# Patient Record
Sex: MALE | Race: WHITE | NOT HISPANIC OR LATINO | URBAN - METROPOLITAN AREA
[De-identification: names, ages, dates, MRNs, and addresses within clinical notes are randomized per-mention and may not be internally consistent; named-entity substitution may affect disease eponyms.]

---

## 2023-10-16 DIAGNOSIS — E78.5 HYPERLIPIDEMIA, UNSPECIFIED HYPERLIPIDEMIA TYPE: Primary | ICD-10-CM

## 2023-10-16 PROBLEM — I10 HYPERTENSION: Status: ACTIVE | Noted: 2023-10-16

## 2023-10-16 PROBLEM — I71.21 ASCENDING AORTIC ANEURYSM (CMS-HCC): Status: ACTIVE | Noted: 2023-10-16

## 2023-10-16 PROBLEM — R93.1 ELEVATED CORONARY ARTERY CALCIUM SCORE: Status: ACTIVE | Noted: 2023-10-16

## 2023-10-16 PROBLEM — I25.10 CAD (CORONARY ARTERY DISEASE): Status: ACTIVE | Noted: 2023-10-16

## 2023-10-16 PROBLEM — G20.A1 PARKINSON'S DISEASE (MULTI): Status: ACTIVE | Noted: 2023-10-16

## 2023-10-16 RX ORDER — METOPROLOL SUCCINATE 50 MG/1
1 TABLET, EXTENDED RELEASE ORAL DAILY
COMMUNITY
Start: 2020-11-12

## 2023-10-16 RX ORDER — AMLODIPINE BESYLATE 5 MG/1
1 TABLET ORAL DAILY
COMMUNITY
Start: 2020-11-12

## 2023-10-16 RX ORDER — DEXTROMETHORPHAN HYDROBROMIDE, GUAIFENESIN 5; 100 MG/5ML; MG/5ML
LIQUID ORAL 4 TIMES DAILY
COMMUNITY
Start: 2021-11-10

## 2023-10-16 RX ORDER — CARBIDOPA AND LEVODOPA 25; 100 MG/1; MG/1
TABLET ORAL
COMMUNITY
Start: 2020-11-12

## 2023-10-16 RX ORDER — ATORVASTATIN CALCIUM 80 MG/1
TABLET, FILM COATED ORAL
Qty: 90 TABLET | Refills: 3 | Status: SHIPPED | OUTPATIENT
Start: 2023-10-16

## 2023-10-16 RX ORDER — ASPIRIN 81 MG/1
1 TABLET ORAL DAILY
COMMUNITY
Start: 2020-11-12

## 2023-10-16 RX ORDER — ENTACAPONE 200 MG/1
TABLET ORAL
COMMUNITY
Start: 2023-09-15

## 2023-10-16 RX ORDER — ATORVASTATIN CALCIUM 80 MG/1
TABLET, FILM COATED ORAL
COMMUNITY
Start: 2023-07-18 | End: 2023-11-14 | Stop reason: SDUPTHER

## 2023-10-16 RX ORDER — VIT A/VIT C/VIT E/ZINC/COPPER 2148-113
1 TABLET ORAL EVERY 12 HOURS
COMMUNITY
Start: 2020-11-12

## 2023-11-08 ENCOUNTER — HOSPITAL ENCOUNTER (OUTPATIENT)
Dept: RADIOLOGY | Facility: HOSPITAL | Age: 83
Discharge: HOME | End: 2023-11-08
Payer: MEDICARE

## 2023-11-08 DIAGNOSIS — I71.21 ANEURYSM OF THE ASCENDING AORTA, WITHOUT RUPTURE (CMS-HCC): ICD-10-CM

## 2023-11-08 PROCEDURE — 71250 CT THORAX DX C-: CPT | Performed by: RADIOLOGY

## 2023-11-08 PROCEDURE — 71250 CT THORAX DX C-: CPT

## 2023-11-12 PROBLEM — E04.1 NONTOXIC UNINODULAR GOITER: Status: ACTIVE | Noted: 2021-11-01

## 2023-11-14 ENCOUNTER — OFFICE VISIT (OUTPATIENT)
Dept: CARDIOLOGY | Facility: HOSPITAL | Age: 83
End: 2023-11-14
Payer: MEDICARE

## 2023-11-14 VITALS
HEIGHT: 69 IN | BODY MASS INDEX: 26.07 KG/M2 | HEART RATE: 63 BPM | DIASTOLIC BLOOD PRESSURE: 62 MMHG | OXYGEN SATURATION: 96 % | WEIGHT: 176 LBS | SYSTOLIC BLOOD PRESSURE: 112 MMHG

## 2023-11-14 DIAGNOSIS — I71.20 THORACIC AORTIC ANEURYSM WITHOUT RUPTURE, UNSPECIFIED PART (CMS-HCC): ICD-10-CM

## 2023-11-14 DIAGNOSIS — I25.10 CORONARY ARTERY DISEASE, UNSPECIFIED VESSEL OR LESION TYPE, UNSPECIFIED WHETHER ANGINA PRESENT, UNSPECIFIED WHETHER NATIVE OR TRANSPLANTED HEART: Primary | ICD-10-CM

## 2023-11-14 DIAGNOSIS — E78.5 HYPERLIPIDEMIA, UNSPECIFIED HYPERLIPIDEMIA TYPE: ICD-10-CM

## 2023-11-14 DIAGNOSIS — K86.2 CYST OF PANCREAS (HHS-HCC): ICD-10-CM

## 2023-11-14 DIAGNOSIS — I71.21 ANEURYSM OF ASCENDING AORTA WITHOUT RUPTURE (CMS-HCC): ICD-10-CM

## 2023-11-14 PROCEDURE — 3074F SYST BP LT 130 MM HG: CPT | Performed by: INTERNAL MEDICINE

## 2023-11-14 PROCEDURE — 1036F TOBACCO NON-USER: CPT | Performed by: INTERNAL MEDICINE

## 2023-11-14 PROCEDURE — 1160F RVW MEDS BY RX/DR IN RCRD: CPT | Performed by: INTERNAL MEDICINE

## 2023-11-14 PROCEDURE — 99214 OFFICE O/P EST MOD 30 MIN: CPT | Mod: 25 | Performed by: INTERNAL MEDICINE

## 2023-11-14 PROCEDURE — 1159F MED LIST DOCD IN RCRD: CPT | Performed by: INTERNAL MEDICINE

## 2023-11-14 PROCEDURE — 93010 ELECTROCARDIOGRAM REPORT: CPT | Performed by: INTERNAL MEDICINE

## 2023-11-14 PROCEDURE — 99214 OFFICE O/P EST MOD 30 MIN: CPT | Performed by: INTERNAL MEDICINE

## 2023-11-14 PROCEDURE — 93005 ELECTROCARDIOGRAM TRACING: CPT | Performed by: INTERNAL MEDICINE

## 2023-11-14 PROCEDURE — 3078F DIAST BP <80 MM HG: CPT | Performed by: INTERNAL MEDICINE

## 2023-11-14 ASSESSMENT — ENCOUNTER SYMPTOMS
OCCASIONAL FEELINGS OF UNSTEADINESS: 0
LOSS OF SENSATION IN FEET: 0
DEPRESSION: 0

## 2023-11-14 ASSESSMENT — PATIENT HEALTH QUESTIONNAIRE - PHQ9
1. LITTLE INTEREST OR PLEASURE IN DOING THINGS: NOT AT ALL
2. FEELING DOWN, DEPRESSED OR HOPELESS: NOT AT ALL
SUM OF ALL RESPONSES TO PHQ9 QUESTIONS 1 AND 2: 0

## 2023-11-14 NOTE — PROGRESS NOTES
Primary Care Physician: Lalito Garber DO  Date of Visit: 11/14/2023 10:40 AM EST  Location of visit: [facility]     Chief Complaint:   Chief Complaint   Patient presents with    ascending aortic aneurysm    Hyperlipidemia    Hypertension    Coronary Artery Disease        HPI / Summary:   Erich Kaba is a 83 y.o. male presents for followup.  He has no cardiac complaints.  He is physically active and uses a recumbent bicycle for up to 25 minutes daily without chest pain or shortness of breath.  The patient denies chest pain, shortness of breath, palpitations, lightheadedness, syncope, orthopnea, paroxysmal nocturnal dyspnea, lower extremity edema, or bleeding problems.          Past Medical History:  Past Medical History:   Diagnosis Date    Personal history of (healed) traumatic fracture     History of fracture of lower extremity    Personal history of other malignant neoplasm of skin     History of malignant neoplasm of skin        Past Surgical History:  Past Surgical History:   Procedure Laterality Date    OTHER SURGICAL HISTORY  11/12/2020    Knee surgery    OTHER SURGICAL HISTORY  11/12/2020    Skin lesion excision    OTHER SURGICAL HISTORY  11/12/2020    Leg surgery    OTHER SURGICAL HISTORY  11/12/2020    Hernia repair          Social History:   reports that he has quit smoking. His smoking use included pipe. He has never used smokeless tobacco. He reports current alcohol use of about 3.0 standard drinks of alcohol per week. Drug use questions deferred to the physician.     Family History:  family history is not on file.      Allergies:  No Known Allergies    Outpatient Medications:  Current Outpatient Medications   Medication Instructions    acetaminophen (Tylenol 8 HOUR) 650 mg ER tablet oral, 4 times daily    amLODIPine (Norvasc) 5 mg tablet 1 tablet, oral, Daily    aspirin 81 mg EC tablet 1 tablet, oral, Daily    atorvastatin (Lipitor) 80 mg tablet TAKE 1 TABLET DAILY (DOSE INCREASED)     "carbidopa-levodopa (Sinemet)  mg tablet oral    entacapone (Comtan) 200 mg tablet     metoprolol succinate XL (Toprol-XL) 50 mg 24 hr tablet 1 tablet, oral, Daily    vitamins A,C,E-zinc-copper (PreserVision AREDS) 2,148 mcg-113 mg-45 mg-17.4mg tablet 1 tablet, oral, Every 12 hours       Physical Exam:  Vitals:    11/14/23 1138   BP: 112/62   BP Location: Right arm   Patient Position: Sitting   Pulse: 63   SpO2: 96%   Weight: 79.8 kg (176 lb)   Height: 1.753 m (5' 9\")     Wt Readings from Last 5 Encounters:   11/14/23 79.8 kg (176 lb)   11/08/22 79.9 kg (176 lb 0.6 oz)   11/10/21 81.2 kg (179 lb 0.3 oz)   11/12/20 83 kg (183 lb)     Body mass index is 25.99 kg/m².   General: Well-developed well-nourished in no acute distress  HEENT: Normocephalic atraumatic  Neck: Supple, JVP is normal negative hepatojugular reflux 2+ carotid pulses without bruit  Pulmonary: Normal respiratory effort, clear to auscultation  Cardiovascular: No right ventricular heave, normal S1 and S2, no murmurs rubs or gallops  Abdomen: Soft nontender nondistended  Extremities: Warm without edema 2+ radial pulses bilaterally 2+ posterior tibial pulses bilaterally  Neurologic: Alert and oriented x3  Psychiatric: Normal mood and affect     Last Labs:  CMP:  Recent Labs     10/11/21  1004      K 4.6      CO2 30   ANIONGAP 12   BUN 31*   CREATININE 1.26   GLUCOSE 111*   No results for input(s): \"ALBUMIN\", \"ALKPHOS\", \"ALT\", \"AST\", \"BILITOT\", \"LIPASE\" in the last 24908 hours.    No lab exists for component: \"CA\"  CBC:  Recent Labs     10/11/21  1004   WBC 6.6   HGB 15.9   HCT 46.2      MCV 91     COAG: No results for input(s): \"INR\", \"DDIMERVTE\" in the last 02186 hours.  HEME/ENDO:No results for input(s): \"FERRITIN\", \"IRONSAT\", \"TSH\", \"HGBA1C\" in the last 95362 hours.   CARDIAC: No results for input(s): \"LDH\", \"CKMB\", \"TROPHS\", \"BNP\" in the last 07983 hours.    No lab exists for component: \"CK\", \"CKMBP\"  Recent Labs     " 02/10/23  0935 10/11/21  1004 01/14/21  0926   CHOL 100 120 109   LDLF 50 66 60   HDL 38.5* 42.5 35.2*   TRIG 60 60 70     I reviewed laboratory data from November 6, 2023.  Total cholesterol 111 triglycerides 63 HDL 41 LDL 57 glucose 110 creatinine 0.93 potassium 4.7      Last Cardiology Tests:  ECG:  An electrocardiogram performed today that I reviewed shows normal sinus rhythm right bundle branch block left anterior fascicular block.    Echo:  November 8, 2022  CONCLUSIONS:   1. Left ventricular systolic function is normal with a 60-65% estimated ejection fraction.   2. Spectral Doppler shows an impaired relaxation pattern of left ventricular diastolic filling.   3. The left atrium is mild to moderately dilated.   4. Left ventricular cavity size is decreased.   5. There is moderate dilatation of the aortic root.       Cath:      Stress Test:  Stress Results:  No results found for this or any previous visit from the past 365 days.         Cardiac Imaging:  CT chest wo IV contrast  Narrative: Interpreted By:  Nuvia Judd,   STUDY:  CT CHEST WO IV CONTRAST;  11/8/2023 10:15 am      INDICATION:  Signs/Symptoms: surveillance of ascending aortic aneursym  I71.21:  Ascending aortic aneurysm.      COMPARISON:  11/03/2021      ACCESSION NUMBER(S):  [id number]      ORDERING CLINICIAN:  RUTH RENDON      TECHNIQUE:  Helical data acquisition of the chest was obtained  without IV  contrast material.  Images were reformatted in axial, coronal, and  sagittal planes.      FINDINGS:  Lungs and Pleura: Scattered streaky atelectasis. No pulmonary  consolidation. No pleural effusion. No pneumothorax.      Mediastinum and axilla: No adenopathy by CT size criteria. No  cardiomegaly or pericardial effusion. Mild ascending thoracic aorta  aneurysm measuring 4 cm. Mild descending thoracic aortic aneurysm  measuring 3.2 cm. These are stable when compared to prior exam. Heavy  coronary artery calcifications. Mitral and aortic  calcifications.      Visualized Upper Abdomen: Cholelithiasis. Pancreatic cystic lesion  measuring 3 cm. If not already performed, recommend follow-up with  MRI pancreas protocol. Multiple left renal cysts. Partially  visualized abdominal aortic aneurysm measuring 3.1 cm in diameter.      MSK/Chest Wall: No aggressive bony lesion identified. Multilevel  degenerative changes in the spine.      Lower neck: Stable 3.8 cm left thyroid nodule.          Impression: Stable aortic aneurysm.      Partially visualized abdominal aortic aneurysm.      Large cystic lesion in the pancreas. Recommend follow-up with  pancreas MRI.      Cholelithiasis.      Signed by: Nuvia Judd 11/9/2023 11:15 AM  Dictation workstation:   [id number]        Assessment/Plan   Diagnoses and all orders for this visit:  Coronary artery disease, unspecified vessel or lesion type, unspecified whether angina present, unspecified whether native or transplanted heart  -     Lipid panel; Future  -     AST; Future  -     ALT; Future  Hyperlipidemia, unspecified hyperlipidemia type  -     ECG 12 lead (Clinic Performed)  Aneurysm of ascending aorta without rupture (CMS/HCC)  -     Vascular US aorta iliac duplex complete; Future  -     CT abdomen wo IV contrast; Future  Thoracic aortic aneurysm without rupture, unspecified part (CMS/HCC)  -     CT chest wo IV contrast; Future  Cyst of pancreas  -     MR abdomen wo IV contrast; Future    In summary  is a pleasant 83 year-old white male with a past medical history significant for coronary artery disease with a CT calcium score of 1598, mild ascending aortic aneurysm of 4 cm, abdominal aortic aneurysm, hypertension, hyperlipidemia, trifascicular block, Parkinson's disease, and remote tobacco use.  He is asymptomatic from a cardiac perspective.  His blood pressure is controlled.  His most recent lipid profile was marginal.  We did discuss adding ezetimibe.  He is going to intensify his efforts with  regard to his diet and exercise.  We will order repeat lipid profile for 3 months.  I did order an abdominal aortic ultrasound to evaluate his abdominal aorta more fully.  I also did inform him of the pancreatic cyst and the recommendation by radiology for a MRI.  He is going to follow-up with his primary physician.  He should continue his other cardiovascular medications.  We will see him back in follow-up in 1 year      Orders:  No orders of the defined types were placed in this encounter.     Followup Appts:  No future appointments.        ____________________________________________________________  Roosevelt Pollard MD  [facility] [place]  [facility]

## 2023-11-14 NOTE — PATIENT INSTRUCTIONS
Check abdominal aortic ultrasound.  Check MRI of the pancreas.  Recheck lipids, AST, ALT in 3 months.  Check noncontrast CT of the chest and abdomen in 1 year.  Follow-up in 1 year.

## 2023-12-03 LAB
ATRIAL RATE: 61 BPM
P AXIS: 73 DEGREES
P OFFSET: 154 MS
P ONSET: 87 MS
PR INTERVAL: 242 MS
Q ONSET: 208 MS
QRS COUNT: 10 BEATS
QRS DURATION: 140 MS
QT INTERVAL: 470 MS
QTC CALCULATION(BAZETT): 473 MS
QTC FREDERICIA: 472 MS
R AXIS: -70 DEGREES
T AXIS: 53 DEGREES
T OFFSET: 443 MS
VENTRICULAR RATE: 61 BPM

## 2023-12-05 ENCOUNTER — HOSPITAL ENCOUNTER (OUTPATIENT)
Dept: RADIOLOGY | Facility: HOSPITAL | Age: 83
Discharge: HOME | End: 2023-12-05
Payer: MEDICARE

## 2023-12-05 ENCOUNTER — HOSPITAL ENCOUNTER (OUTPATIENT)
Dept: VASCULAR MEDICINE | Facility: HOSPITAL | Age: 83
Discharge: HOME | End: 2023-12-05
Payer: MEDICARE

## 2023-12-05 DIAGNOSIS — I71.21 ANEURYSM OF ASCENDING AORTA WITHOUT RUPTURE (CMS-HCC): ICD-10-CM

## 2023-12-05 DIAGNOSIS — K86.2 CYST OF PANCREAS (HHS-HCC): ICD-10-CM

## 2023-12-05 DIAGNOSIS — I71.40 ABDOMINAL AORTIC ANEURYSM, WITHOUT RUPTURE, UNSPECIFIED (CMS-HCC): ICD-10-CM

## 2023-12-05 PROCEDURE — A9575 INJ GADOTERATE MEGLUMI 0.1ML: HCPCS | Performed by: INTERNAL MEDICINE

## 2023-12-05 PROCEDURE — 93978 VASCULAR STUDY: CPT

## 2023-12-05 PROCEDURE — 2550000001 HC RX 255 CONTRASTS: Performed by: INTERNAL MEDICINE

## 2023-12-05 PROCEDURE — 93978 VASCULAR STUDY: CPT | Performed by: INTERNAL MEDICINE

## 2023-12-05 PROCEDURE — 74181 MRI ABDOMEN W/O CONTRAST: CPT

## 2023-12-05 RX ORDER — GADOTERATE MEGLUMINE 376.9 MG/ML
16 INJECTION INTRAVENOUS
Status: COMPLETED | OUTPATIENT
Start: 2023-12-05 | End: 2023-12-05

## 2023-12-05 RX ADMIN — GADOTERATE MEGLUMINE 16 ML: 376.9 INJECTION INTRAVENOUS at 14:48

## 2023-12-05 NOTE — RESULT ENCOUNTER NOTE
I called the patient and informed him of his MRI results and his abdominal aortic ultrasound results.  I informed him of the abnormality in his pancreas and the need to see an hepatobiliary surgeon.  He will follow-up with his primary physician tomorrow requesting this.  He will call my office if he needs assistance.

## 2024-02-16 ENCOUNTER — LAB (OUTPATIENT)
Dept: LAB | Facility: LAB | Age: 84
End: 2024-02-16
Payer: MEDICARE

## 2024-02-16 DIAGNOSIS — I25.10 CORONARY ARTERY DISEASE, UNSPECIFIED VESSEL OR LESION TYPE, UNSPECIFIED WHETHER ANGINA PRESENT, UNSPECIFIED WHETHER NATIVE OR TRANSPLANTED HEART: ICD-10-CM

## 2024-02-16 LAB
ALT SERPL W P-5'-P-CCNC: 9 U/L (ref 10–52)
AST SERPL W P-5'-P-CCNC: 18 U/L (ref 9–39)
CHOLEST SERPL-MCNC: 98 MG/DL (ref 0–199)
CHOLESTEROL/HDL RATIO: 2.6
HDLC SERPL-MCNC: 38.3 MG/DL
LDLC SERPL CALC-MCNC: 47 MG/DL
NON HDL CHOLESTEROL: 60 MG/DL (ref 0–149)
TRIGL SERPL-MCNC: 62 MG/DL (ref 0–149)
VLDL: 12 MG/DL (ref 0–40)

## 2024-02-16 PROCEDURE — 84460 ALANINE AMINO (ALT) (SGPT): CPT

## 2024-02-16 PROCEDURE — 80061 LIPID PANEL: CPT

## 2024-02-16 PROCEDURE — 36415 COLL VENOUS BLD VENIPUNCTURE: CPT

## 2024-02-16 PROCEDURE — 84450 TRANSFERASE (AST) (SGOT): CPT

## 2024-11-12 ENCOUNTER — HOSPITAL ENCOUNTER (OUTPATIENT)
Dept: RADIOLOGY | Facility: HOSPITAL | Age: 84
Discharge: HOME | End: 2024-11-12
Payer: MEDICARE

## 2024-11-12 DIAGNOSIS — I71.21 ANEURYSM OF ASCENDING AORTA WITHOUT RUPTURE (CMS-HCC): ICD-10-CM

## 2024-11-12 PROCEDURE — 71250 CT THORAX DX C-: CPT | Performed by: RADIOLOGY

## 2024-11-12 PROCEDURE — 74176 CT ABD & PELVIS W/O CONTRAST: CPT

## 2024-11-12 PROCEDURE — 74176 CT ABD & PELVIS W/O CONTRAST: CPT | Performed by: RADIOLOGY

## 2024-11-19 ENCOUNTER — OFFICE VISIT (OUTPATIENT)
Dept: CARDIOLOGY | Facility: HOSPITAL | Age: 84
End: 2024-11-19
Payer: MEDICARE

## 2024-11-19 VITALS
BODY MASS INDEX: 24.64 KG/M2 | HEIGHT: 70 IN | DIASTOLIC BLOOD PRESSURE: 60 MMHG | SYSTOLIC BLOOD PRESSURE: 120 MMHG | WEIGHT: 172.1 LBS | OXYGEN SATURATION: 97 % | HEART RATE: 57 BPM

## 2024-11-19 DIAGNOSIS — E78.5 HYPERLIPIDEMIA, UNSPECIFIED HYPERLIPIDEMIA TYPE: ICD-10-CM

## 2024-11-19 DIAGNOSIS — I71.21 ANEURYSM OF ASCENDING AORTA WITHOUT RUPTURE (CMS-HCC): ICD-10-CM

## 2024-11-19 DIAGNOSIS — I25.10 CORONARY ARTERY DISEASE, UNSPECIFIED VESSEL OR LESION TYPE, UNSPECIFIED WHETHER ANGINA PRESENT, UNSPECIFIED WHETHER NATIVE OR TRANSPLANTED HEART: Primary | ICD-10-CM

## 2024-11-19 DIAGNOSIS — I10 HYPERTENSION, UNSPECIFIED TYPE: ICD-10-CM

## 2024-11-19 LAB
ATRIAL RATE: 57 BPM
P AXIS: 78 DEGREES
P OFFSET: 162 MS
P ONSET: 99 MS
PR INTERVAL: 246 MS
Q ONSET: 222 MS
QRS COUNT: 9 BEATS
QRS DURATION: 134 MS
QT INTERVAL: 470 MS
QTC CALCULATION(BAZETT): 457 MS
QTC FREDERICIA: 462 MS
R AXIS: -58 DEGREES
T AXIS: 76 DEGREES
T OFFSET: 457 MS
VENTRICULAR RATE: 57 BPM

## 2024-11-19 PROCEDURE — 1036F TOBACCO NON-USER: CPT | Performed by: INTERNAL MEDICINE

## 2024-11-19 PROCEDURE — 99214 OFFICE O/P EST MOD 30 MIN: CPT | Performed by: INTERNAL MEDICINE

## 2024-11-19 PROCEDURE — 93005 ELECTROCARDIOGRAM TRACING: CPT | Performed by: INTERNAL MEDICINE

## 2024-11-19 PROCEDURE — 3074F SYST BP LT 130 MM HG: CPT | Performed by: INTERNAL MEDICINE

## 2024-11-19 PROCEDURE — 99417 PROLNG OP E/M EACH 15 MIN: CPT | Performed by: INTERNAL MEDICINE

## 2024-11-19 PROCEDURE — 1160F RVW MEDS BY RX/DR IN RCRD: CPT | Performed by: INTERNAL MEDICINE

## 2024-11-19 PROCEDURE — 3078F DIAST BP <80 MM HG: CPT | Performed by: INTERNAL MEDICINE

## 2024-11-19 PROCEDURE — 1159F MED LIST DOCD IN RCRD: CPT | Performed by: INTERNAL MEDICINE

## 2024-11-19 NOTE — PROGRESS NOTES
Primary Care Physician: Lalito Garber DO  Date of Visit: 11/19/2024 11:00 AM EST  Location of visit: [facility]     Chief Complaint:   Chief Complaint   Patient presents with    Follow-up        HPI / Summary:   Erich Kaba is a 84 y.o. male presents for followup.  He has no cardiac complaints.  He is physically active and rides a bicycle as a day in addition to golfing and working in the garden without chest pain shortness of breath.  The patient denies chest pain, shortness of breath, palpitations, lightheadedness, syncope, orthopnea, paroxysmal nocturnal dyspnea, lower extremity edema, or bleeding problems.        Past Medical History:  Past Medical History:   Diagnosis Date    Personal history of (healed) traumatic fracture     History of fracture of lower extremity    Personal history of other malignant neoplasm of skin     History of malignant neoplasm of skin        Past Surgical History:  Past Surgical History:   Procedure Laterality Date    OTHER SURGICAL HISTORY  11/12/2020    Knee surgery    OTHER SURGICAL HISTORY  11/12/2020    Skin lesion excision    OTHER SURGICAL HISTORY  11/12/2020    Leg surgery    OTHER SURGICAL HISTORY  11/12/2020    Hernia repair          Social History:   reports that he has quit smoking. His smoking use included pipe. He has never used smokeless tobacco. He reports current alcohol use of about 3.0 standard drinks of alcohol per week. Drug use questions deferred to the physician.     Family History:  family history is not on file.      Allergies:  No Known Allergies    Outpatient Medications:  Current Outpatient Medications   Medication Instructions    acetaminophen (Tylenol 8 HOUR) 650 mg ER tablet 4 times daily    amLODIPine (Norvasc) 5 mg tablet 1 tablet, Daily    aspirin 81 mg EC tablet 1 tablet, Daily    atorvastatin (Lipitor) 80 mg tablet TAKE 1 TABLET DAILY (DOSE INCREASED)    carbidopa-levodopa (Sinemet)  mg tablet 2.5 tablets, 3 times daily     "entacapone (Comtan) 200 mg tablet     metoprolol succinate XL (Toprol-XL) 50 mg 24 hr tablet 1 tablet, Daily    vitamins A,C,E-zinc-copper (PreserVision AREDS) 2,148 mcg-113 mg-45 mg-17.4mg tablet 1 tablet, Every 12 hours       Physical Exam:  Vitals:    11/19/24 1103   BP: 120/60   BP Location: Right arm   Pulse: 57   SpO2: 97%   Weight: 78.1 kg (172 lb 1.6 oz)   Height: 1.778 m (5' 10\")     Wt Readings from Last 5 Encounters:   11/19/24 78.1 kg (172 lb 1.6 oz)   11/14/23 79.8 kg (176 lb)   11/08/22 79.9 kg (176 lb 0.6 oz)   11/10/21 81.2 kg (179 lb 0.3 oz)   11/12/20 83 kg (183 lb)     Body mass index is 24.69 kg/m².   General: Well-developed well-nourished in no acute distress  HEENT: Normocephalic atraumatic  Neck: Supple, JVP is normal negative hepatojugular reflux 2+ carotid pulses without bruit  Pulmonary: Normal respiratory effort, clear to auscultation  Cardiovascular: No right ventricular heave, normal S1 and S2, no murmurs rubs or gallops  Abdomen: Soft nontender nondistended  Extremities: Warm without edema 2+ radial pulses bilaterally 2+ posterior tibial pulses bilaterally  Neurologic: Alert and oriented x3  Psychiatric: Normal mood and affect     Last Labs:  CMP:  Recent Labs     10/11/21  1004      K 4.6      CO2 30   ANIONGAP 12   BUN 31*   CREATININE 1.26   GLUCOSE 111*     Recent Labs     02/16/24  0933   ALT 9*   AST 18     CBC:  Recent Labs     10/11/21  1004   WBC 6.6   HGB 15.9   HCT 46.2      MCV 91     COAG: No results for input(s): \"INR\", \"DDIMERVTE\" in the last 17221 hours.  HEME/ENDO:No results for input(s): \"FERRITIN\", \"IRONSAT\", \"TSH\", \"HGBA1C\" in the last 23714 hours.   CARDIAC: No results for input(s): \"LDH\", \"CKMB\", \"TROPHS\", \"BNP\" in the last 51429 hours.    No lab exists for component: \"CK\", \"CKMBP\"  Recent Labs     02/16/24  0933 02/10/23  0935 10/11/21  1004 01/14/21  0926   CHOL 98 100 120 109   LDLF  --  50 66 60   LDLCALC 47  --   --   --    HDL 38.3 38.5* " 42.5 35.2*   TRIG 62 60 60 70     I reviewed laboratory data from November 6, 2023.  Total cholesterol 111 triglycerides 63 HDL 41 LDL 57 glucose 110 creatinine 0.93 potassium 4.7      Last Cardiology Tests:  ECG:  An electrocardiogram performed today that I reviewed shows normal sinus rhythm right bundle branch block left anterior fascicular block first-degree AV block.    Echo:  November 8, 2022  CONCLUSIONS:   1. Left ventricular systolic function is normal with a 60-65% estimated ejection fraction.   2. Spectral Doppler shows an impaired relaxation pattern of left ventricular diastolic filling.   3. The left atrium is mild to moderately dilated.   4. Left ventricular cavity size is decreased.   5. There is moderate dilatation of the aortic root.       Cath:      Stress Test:  Stress Results:  No results found for this or any previous visit from the past 365 days.         Cardiac Imaging:  CT chest abdomen pelvis wo IV contrast  Narrative: Interpreted By:  Jeff Lizarraga and Jiang Sirui   STUDY:  CT CHEST ABDOMEN PELVIS WO CONTRAST;  11/12/2024 12:56 pm      INDICATION:  Signs/Symptoms:abdominial and thoratic aneurism.      ,I71.21 Aneurysm of the ascending aorta, without rupture (CMS-HCC)      COMPARISON:  CT chest 11/08/2023  MRI abdomen 12/05/2023      ACCESSION NUMBER(S):  [id number]      ORDERING CLINICIAN:  RUTH RENDON      TECHNIQUE:  CT of the chest, abdomen and pelvis was performed. Contiguous axial  images were obtained at 3 mm slice thickness through the chest,  abdomen and pelvis. Coronal and sagittal reconstructions at 3 mm  slice thickness were performed.  No intravenous or oral contrast  agents were administered.      FINDINGS:  Please note that the study is limited without intravenous contrast.      CHEST:      LUNG/PLEURA/LARGE AIRWAYS:  The trachea and central airways are patent. No endobronchial lesion.  No consolidation, pleural effusion, or pneumothorax. Mild  biapical  pleural-parenchymal scarring is noted. Bibasilar atelectasis,  right-greater-than-left. No new suspicious or enlarging pulmonary  nodules identified.      VESSELS:  Stable dilated ascending thoracic aorta measuring 4.0 cm. Normal  caliber main pulmonary artery. Moderate atherosclerotic calcification  of the thoracic aorta is noted. Moderate to severe coronary artery  calcifications are noted.      HEART:  The heart is normal in size.  No pericardial effusion      MEDIASTINUM AND BHUMIKA:  There are again scattered prominent mediastinal lymph nodes measuring  up to 0.7 cm (series 201, image 48) which are stable since the prior  examination and likely reactive in etiology. No new lymphadenopathy.  Esophagus is unremarkable.      CHEST WALL AND LOWER NECK:  The soft tissues of the chest wall are unremarkable. A 3.6 cm  hypoattenuating lesion of the left thyroid is again noted (series  201, image 25), unchanged since prior.      ABDOMEN:      LIVER:  The liver is normal in size without evidence of focal liver lesions.      BILE DUCTS:  The bile ducts are not dilated.      GALLBLADDER:  Cholelithiasis without evidence of acute cholecystitis.      PANCREAS:  There is a stable 2.7 x 2.1 cm cystic lesion of the pancreatic body  (series 201, image 112). No pancreatic ductal dilatation or  peripancreatic fluid.      SPLEEN:  Unremarkable.      ADRENAL GLANDS:  Unremarkable.      KIDNEYS AND URETERS:  The kidneys are similar in size and appearance. No  hydroureteronephrosis or nephroureterolithiasis. Stable left-sided  simple renal cysts. Stable proteinaceous/hemorrhagic cyst of the  right kidney measured 0.8 cm (series 201, image 110).      PELVIS:      BLADDER:  The urinary bladder appears within normal limits.      REPRODUCTIVE ORGANS:  Prostate is enlarged measuring 5.7 cm in maximal transverse  dimensions.      BOWEL:  The stomach is decompressed, limited for evaluation. The small and  large bowel demonstrate no  abnormal bowel thickening or dilatation.  Scattered colonic diverticulosis without evidence of acute  diverticulitis. The appendix is not definitively visualized. VESSELS:  Mildly dilated infrarenal abdominal aorta measuring 2.9 cm which is  unchanged compared to prior examination. Moderate to severe  atherosclerotic calcification of the abdominal aorta and its  branching vessels are again noted. The IVC is unremarkable.      PERITONEUM/RETROPERITONEUM/LYMPH NODES:  There is no free or loculated fluid collection, no free  intraperitoneal air.  The retroperitoneum appears unremarkable.  No  abdominopelvic lymphadenopathy is present.      ABDOMINAL WALL:  The abdominal wall soft tissues appear normal.      BONES:  No suspicious osseous lesions are present. Degenerative discogenic  disease is noted in the lower thoracic and lumbar spine. Ghost tracks  of a prior right-sided femoral nail are again noted.      Impression: 1. Stable dilated ascending thoracic aorta measuring 4.0 cm and  descending thoracic aorta measuring 3.1 cm, continued attention on  follow-up imaging is recommended.  2. Stable cystic mass of the pancreatic body which is better  characterized on the MRI dated 12/05/2023.  3. Prostatomegaly, correlate PSA values.  4. Additional stable chronic and incidental findings as described  above.      I personally reviewed the image(s) / study and I agree with the  findings as stated by Loli Lema MD. This study was interpreted at  [facility] [place], [place].      MACRO:  None      Signed by: Jeff Lizarraga 11/13/2024 8:29 PM  Dictation workstation:   [id number]        Assessment/Plan   Diagnoses and all orders for this visit:  Coronary artery disease, unspecified vessel or lesion type, unspecified whether angina present, unspecified whether native or transplanted heart  Hypertension, unspecified type  -     ECG 12 lead (Clinic Performed)  -     CBC; Future  Aneurysm of ascending aorta  without rupture (CMS-HCC)  -     CT chest wo IV contrast; Future  Hyperlipidemia, unspecified hyperlipidemia type  -     Lipid Panel; Future  -     Comprehensive Metabolic Panel; Future    In summary  is a pleasant 84 year-old white male with a past medical history significant for coronary artery disease with a CT calcium score of 1598, mild ascending aortic aneurysm of 4 cm,  hypertension, hyperlipidemia, trifascicular block, Parkinson's disease, and remote tobacco use.  He is asymptomatic from a cardiac perspective.  His blood pressure and lipid profile are at goal.  I ordered labs as indicated below.  His recent CT shows a stable ascending aorta.  He will follow-up with his primary physician regarding his pancreatic abnormality.  He should continue his current cardiovascular medications.  We will see him back in follow-up in 1 year with a noncontrast CT of his chest for surveillance of his aorta.      Orders:  Orders Placed This Encounter   Procedures    CT chest wo IV contrast    Lipid Panel    Comprehensive Metabolic Panel    CBC    ECG 12 lead (Clinic Performed)      Followup Appts:  No future appointments.        ____________________________________________________________  Roosevelt Pollard MD  [facility] [place]  [facility]

## 2024-11-19 NOTE — PATIENT INSTRUCTIONS
Check CMP, CBC, lipid profile.  Check a noncontrast CT of the chest in 1 year.  Follow-up in 1 year.

## 2024-12-10 DIAGNOSIS — E78.5 HYPERLIPIDEMIA, UNSPECIFIED HYPERLIPIDEMIA TYPE: Primary | ICD-10-CM

## 2024-12-10 RX ORDER — ATORVASTATIN CALCIUM 80 MG/1
80 TABLET, FILM COATED ORAL DAILY
Qty: 90 TABLET | Refills: 3 | Status: SHIPPED | OUTPATIENT
Start: 2024-12-10 | End: 2025-12-10
